# Patient Record
Sex: FEMALE | Race: WHITE | NOT HISPANIC OR LATINO | Employment: FULL TIME | URBAN - METROPOLITAN AREA
[De-identification: names, ages, dates, MRNs, and addresses within clinical notes are randomized per-mention and may not be internally consistent; named-entity substitution may affect disease eponyms.]

---

## 2020-12-22 ENCOUNTER — IMMUNIZATION (OUTPATIENT)
Dept: VACCINE CLINIC | Facility: HOSPITAL | Age: 40
End: 2020-12-22

## 2020-12-22 PROCEDURE — 91300 HC SARSCOV02 VAC 30MCG/0.3ML IM: CPT | Performed by: INTERNAL MEDICINE

## 2020-12-22 PROCEDURE — 0001A: CPT | Performed by: INTERNAL MEDICINE

## 2021-01-12 ENCOUNTER — IMMUNIZATION (OUTPATIENT)
Dept: VACCINE CLINIC | Facility: HOSPITAL | Age: 41
End: 2021-01-12

## 2021-01-12 PROCEDURE — 0002A: CPT | Performed by: INTERNAL MEDICINE

## 2021-01-12 PROCEDURE — 0001A: CPT | Performed by: INTERNAL MEDICINE

## 2021-01-12 PROCEDURE — 91300 HC SARSCOV02 VAC 30MCG/0.3ML IM: CPT | Performed by: INTERNAL MEDICINE

## 2022-01-20 ENCOUNTER — IMMUNIZATION (OUTPATIENT)
Dept: VACCINE CLINIC | Facility: HOSPITAL | Age: 42
End: 2022-01-20

## 2022-01-20 PROCEDURE — 91300 HC SARSCOV02 VAC 30MCG/0.3ML IM: CPT | Performed by: INTERNAL MEDICINE

## 2022-01-20 PROCEDURE — 0004A HC ADM SARSCOV2 30MCG/0.3ML BOOSTER: CPT | Performed by: INTERNAL MEDICINE

## 2022-10-03 ENCOUNTER — IMMUNIZATION (OUTPATIENT)
Dept: VACCINE CLINIC | Facility: HOSPITAL | Age: 42
End: 2022-10-03

## 2022-10-03 DIAGNOSIS — Z23 NEED FOR VACCINATION: Primary | ICD-10-CM

## 2022-10-03 PROCEDURE — 91312 HC SARSCOV2 VAC 30MCG/0.3ML IM BIVALENT BOOSTER 12 YRS AND OLDER: CPT | Performed by: HOSPITALIST

## 2022-10-03 PROCEDURE — 0124A: CPT | Performed by: HOSPITALIST

## 2022-10-14 ENCOUNTER — OFFICE VISIT (OUTPATIENT)
Dept: FAMILY MEDICINE CLINIC | Facility: CLINIC | Age: 42
End: 2022-10-14

## 2022-10-14 ENCOUNTER — LAB (OUTPATIENT)
Dept: LAB | Facility: HOSPITAL | Age: 42
End: 2022-10-14

## 2022-10-14 VITALS
SYSTOLIC BLOOD PRESSURE: 112 MMHG | OXYGEN SATURATION: 96 % | DIASTOLIC BLOOD PRESSURE: 86 MMHG | TEMPERATURE: 98 F | BODY MASS INDEX: 39.09 KG/M2 | HEART RATE: 78 BPM | WEIGHT: 229 LBS | HEIGHT: 64 IN | RESPIRATION RATE: 16 BRPM

## 2022-10-14 DIAGNOSIS — Z13.1 SCREENING FOR DIABETES MELLITUS: ICD-10-CM

## 2022-10-14 DIAGNOSIS — Z23 ENCOUNTER FOR IMMUNIZATION: ICD-10-CM

## 2022-10-14 DIAGNOSIS — R10.9 ABDOMINAL PAIN IN FEMALE PATIENT: ICD-10-CM

## 2022-10-14 DIAGNOSIS — Z13.220 SCREENING, LIPID: ICD-10-CM

## 2022-10-14 DIAGNOSIS — Z23 IMMUNIZATION DUE: ICD-10-CM

## 2022-10-14 LAB
ANION GAP SERPL CALCULATED.3IONS-SCNC: 11 MMOL/L (ref 5–15)
BILIRUB BLD-MCNC: NEGATIVE MG/DL
BUN SERPL-MCNC: 16 MG/DL (ref 6–20)
BUN/CREAT SERPL: 19.5 (ref 7–25)
CALCIUM SPEC-SCNC: 8.9 MG/DL (ref 8.6–10.5)
CHLORIDE SERPL-SCNC: 101 MMOL/L (ref 98–107)
CHOLEST SERPL-MCNC: 185 MG/DL (ref 0–200)
CLARITY, POC: CLEAR
CO2 SERPL-SCNC: 25 MMOL/L (ref 22–29)
COLOR UR: YELLOW
CREAT SERPL-MCNC: 0.82 MG/DL (ref 0.57–1)
EGFRCR SERPLBLD CKD-EPI 2021: 92.3 ML/MIN/1.73
EXPIRATION DATE: NORMAL
GLUCOSE SERPL-MCNC: 88 MG/DL (ref 65–99)
GLUCOSE UR STRIP-MCNC: NEGATIVE MG/DL
HDLC SERPL-MCNC: 55 MG/DL (ref 40–60)
KETONES UR QL: NEGATIVE
LDLC SERPL CALC-MCNC: 116 MG/DL (ref 0–100)
LDLC/HDLC SERPL: 2.08 {RATIO}
LEUKOCYTE EST, POC: NEGATIVE
Lab: NORMAL
NITRITE UR-MCNC: NEGATIVE MG/ML
PH UR: 6.5 [PH] (ref 5–8)
POTASSIUM SERPL-SCNC: 4 MMOL/L (ref 3.5–5.2)
PROT UR STRIP-MCNC: NEGATIVE MG/DL
RBC # UR STRIP: NEGATIVE /UL
SODIUM SERPL-SCNC: 137 MMOL/L (ref 136–145)
SP GR UR: 1.02 (ref 1–1.03)
TRIGL SERPL-MCNC: 78 MG/DL (ref 0–150)
UROBILINOGEN UR QL: NORMAL
VLDLC SERPL-MCNC: 14 MG/DL (ref 5–40)

## 2022-10-14 PROCEDURE — 80048 BASIC METABOLIC PNL TOTAL CA: CPT | Performed by: STUDENT IN AN ORGANIZED HEALTH CARE EDUCATION/TRAINING PROGRAM

## 2022-10-14 PROCEDURE — 90686 IIV4 VACC NO PRSV 0.5 ML IM: CPT | Performed by: STUDENT IN AN ORGANIZED HEALTH CARE EDUCATION/TRAINING PROGRAM

## 2022-10-14 PROCEDURE — 90471 IMMUNIZATION ADMIN: CPT | Performed by: STUDENT IN AN ORGANIZED HEALTH CARE EDUCATION/TRAINING PROGRAM

## 2022-10-14 PROCEDURE — 81003 URINALYSIS AUTO W/O SCOPE: CPT | Performed by: STUDENT IN AN ORGANIZED HEALTH CARE EDUCATION/TRAINING PROGRAM

## 2022-10-14 PROCEDURE — 90715 TDAP VACCINE 7 YRS/> IM: CPT | Performed by: STUDENT IN AN ORGANIZED HEALTH CARE EDUCATION/TRAINING PROGRAM

## 2022-10-14 PROCEDURE — 90472 IMMUNIZATION ADMIN EACH ADD: CPT | Performed by: STUDENT IN AN ORGANIZED HEALTH CARE EDUCATION/TRAINING PROGRAM

## 2022-10-14 PROCEDURE — 99203 OFFICE O/P NEW LOW 30 MIN: CPT | Performed by: STUDENT IN AN ORGANIZED HEALTH CARE EDUCATION/TRAINING PROGRAM

## 2022-10-14 PROCEDURE — 80061 LIPID PANEL: CPT | Performed by: STUDENT IN AN ORGANIZED HEALTH CARE EDUCATION/TRAINING PROGRAM

## 2022-10-14 RX ORDER — MULTIPLE VITAMINS W/ MINERALS TAB 9MG-400MCG
1 TAB ORAL DAILY
COMMUNITY

## 2022-10-14 NOTE — PROGRESS NOTES
"  Established Patient Office Visit        Subjective      Chief Complaint:  Cystitis (Patient having lower abdominal cramps, a lot of debris in urine, concerned having bladder spasms, this has been going on for about the last three months )      History of Present Illness: Nelli Benites is a 41 y.o. female who presents for change in urine color and sediment in urine. Sediment in urine is increasing. she has random suprapubic cramping. No association period. It is in the suprapubic region focally. No blood in urine. No dysuria. Has regular periods. No spotting. This has been going on for 6 months but worse over 3 months.      No fevers chills or weight loss. Last period was 9/20. Declines pregnancy test.       There is no problem list on file for this patient.        Current Outpatient Medications:   •  multivitamin with minerals tablet tablet, Take 1 tablet by mouth Daily., Disp: , Rfl:        Objective     Physical Exam:   Vital Signs:   /86 (BP Location: Left arm, Patient Position: Sitting, Cuff Size: Adult)   Pulse 78   Temp 98 °F (36.7 °C) (Temporal)   Resp 16   Ht 162.6 cm (64\")   Wt 104 kg (229 lb)   LMP 09/20/2022 (Exact Date)   SpO2 96%   BMI 39.31 kg/m²      Physical Exam  Constitutional:       General: She is not in acute distress.     Appearance: She is not ill-appearing.   Cardiovascular:      Rate and Rhythm: Normal rate and regular rhythm.   Pulmonary:      Effort: Pulmonary effort is normal.      Breath sounds: Normal breath sounds.   Neurological:      Mental Status: She is alert.   Psychiatric:         Thought Content: Thought content normal.   Mild suprapubic tenderness.  No abdominal mass.         Assessment / Plan      Assessment/Plan:   Diagnoses and all orders for this visit:    1. Abdominal pain in female patient  -     POCT urinalysis dipstick, automated  -UA normal and no alarm signs.  Increase water intake as main treatment avoid spicy foods and artificial sweeteners.  " Differential could include interstitial cystitis or even gasseos pain. No evidence of UTI today.  Follow-up if worsening follow with OB/GYN.    2. Screening for diabetes mellitus  -     Basic Metabolic Panel    3. Encounter for immunization  -     Tdap Vaccine Greater Than or Equal To 8yo IM    4. Immunization due  -     FluLaval/Fluzone >6 mos (3794-2522)    5. Screening, lipid  -     Lipid Panel      Follow Up:   Return in about 1 year (around 10/14/2023) for Wellness visit.      MDM: I spent 30 minutes caring for Nelli on this date of service. This time includes time spent by me in the following activities: preparing for the visit, performing a medically appropriate examination and/or evaluation, counseling and educating the patient/family/caregiver and documenting information in the medical record.      Jin Del Rosario MD  Family Medicine - [facility]

## 2023-10-16 ENCOUNTER — LAB (OUTPATIENT)
Dept: LAB | Facility: HOSPITAL | Age: 43
End: 2023-10-16
Payer: COMMERCIAL

## 2023-10-16 ENCOUNTER — OFFICE VISIT (OUTPATIENT)
Dept: FAMILY MEDICINE CLINIC | Facility: CLINIC | Age: 43
End: 2023-10-16
Payer: COMMERCIAL

## 2023-10-16 VITALS
BODY MASS INDEX: 37.22 KG/M2 | HEART RATE: 82 BPM | RESPIRATION RATE: 18 BRPM | WEIGHT: 218 LBS | OXYGEN SATURATION: 98 % | TEMPERATURE: 97.8 F | SYSTOLIC BLOOD PRESSURE: 112 MMHG | DIASTOLIC BLOOD PRESSURE: 72 MMHG | HEIGHT: 64 IN

## 2023-10-16 DIAGNOSIS — Z00.00 ENCOUNTER FOR ROUTINE ADULT HEALTH EXAMINATION WITHOUT ABNORMAL FINDINGS: ICD-10-CM

## 2023-10-16 DIAGNOSIS — E66.09 CLASS 2 OBESITY DUE TO EXCESS CALORIES WITHOUT SERIOUS COMORBIDITY WITH BODY MASS INDEX (BMI) OF 37.0 TO 37.9 IN ADULT: ICD-10-CM

## 2023-10-16 DIAGNOSIS — Z23 IMMUNIZATION DUE: ICD-10-CM

## 2023-10-16 PROBLEM — E66.812 CLASS 2 OBESITY DUE TO EXCESS CALORIES WITHOUT SERIOUS COMORBIDITY WITH BODY MASS INDEX (BMI) OF 37.0 TO 37.9 IN ADULT: Status: ACTIVE | Noted: 2023-10-16

## 2023-10-16 LAB
ALBUMIN SERPL-MCNC: 4.6 G/DL (ref 3.5–5.2)
ALBUMIN/GLOB SERPL: 1.5 G/DL
ALP SERPL-CCNC: 58 U/L (ref 39–117)
ALT SERPL W P-5'-P-CCNC: 26 U/L (ref 1–33)
ANION GAP SERPL CALCULATED.3IONS-SCNC: 12 MMOL/L (ref 5–15)
AST SERPL-CCNC: 24 U/L (ref 1–32)
BILIRUB SERPL-MCNC: 0.9 MG/DL (ref 0–1.2)
BUN SERPL-MCNC: 15 MG/DL (ref 6–20)
BUN/CREAT SERPL: 17.9 (ref 7–25)
CALCIUM SPEC-SCNC: 10 MG/DL (ref 8.6–10.5)
CHLORIDE SERPL-SCNC: 100 MMOL/L (ref 98–107)
CHOLEST SERPL-MCNC: 185 MG/DL (ref 0–200)
CO2 SERPL-SCNC: 26 MMOL/L (ref 22–29)
CREAT SERPL-MCNC: 0.84 MG/DL (ref 0.57–1)
DEPRECATED RDW RBC AUTO: 39.3 FL (ref 37–54)
EGFRCR SERPLBLD CKD-EPI 2021: 89.1 ML/MIN/1.73
ERYTHROCYTE [DISTWIDTH] IN BLOOD BY AUTOMATED COUNT: 12 % (ref 12.3–15.4)
GLOBULIN UR ELPH-MCNC: 3 GM/DL
GLUCOSE SERPL-MCNC: 99 MG/DL (ref 65–99)
HCT VFR BLD AUTO: 42.4 % (ref 34–46.6)
HDLC SERPL-MCNC: 60 MG/DL (ref 40–60)
HGB BLD-MCNC: 14.4 G/DL (ref 12–15.9)
LDLC SERPL CALC-MCNC: 109 MG/DL (ref 0–100)
LDLC/HDLC SERPL: 1.79 {RATIO}
MCH RBC QN AUTO: 30.8 PG (ref 26.6–33)
MCHC RBC AUTO-ENTMCNC: 34 G/DL (ref 31.5–35.7)
MCV RBC AUTO: 90.6 FL (ref 79–97)
PLATELET # BLD AUTO: 286 10*3/MM3 (ref 140–450)
PMV BLD AUTO: 11.3 FL (ref 6–12)
POTASSIUM SERPL-SCNC: 4.1 MMOL/L (ref 3.5–5.2)
PROT SERPL-MCNC: 7.6 G/DL (ref 6–8.5)
RBC # BLD AUTO: 4.68 10*6/MM3 (ref 3.77–5.28)
SODIUM SERPL-SCNC: 138 MMOL/L (ref 136–145)
TRIGL SERPL-MCNC: 89 MG/DL (ref 0–150)
VLDLC SERPL-MCNC: 16 MG/DL (ref 5–40)
WBC NRBC COR # BLD: 6.54 10*3/MM3 (ref 3.4–10.8)

## 2023-10-16 PROCEDURE — 80061 LIPID PANEL: CPT | Performed by: STUDENT IN AN ORGANIZED HEALTH CARE EDUCATION/TRAINING PROGRAM

## 2023-10-16 PROCEDURE — 85027 COMPLETE CBC AUTOMATED: CPT

## 2023-10-16 PROCEDURE — 80053 COMPREHEN METABOLIC PANEL: CPT

## 2023-10-16 NOTE — ASSESSMENT & PLAN NOTE
Patient's (Body mass index is 37.42 kg/m².) indicates that they are obese (BMI >30) with health conditions that include none . Weight is unchanged. BMI  is above average; BMI management plan is completed. We discussed portion control and increasing exercise.

## 2023-10-16 NOTE — PROGRESS NOTES
"     Female Physical Note      Patient Name: Nelli Benites  : 1980   MRN: [id number]     Subjective     Subjective      Chief Complaint:    Chief Complaint   Patient presents with    Annual Exam       History of Present Illness: Nelli Benites is a 42 y.o. female who is here today for their annual health maintenance and physical.         Past Medical History, Social History, Family History and Care Team were all reviewed with patient and updated as appropriate.     Medications:     Current Outpatient Medications:     multivitamin with minerals tablet tablet, Take 1 tablet by mouth Daily., Disp: , Rfl:     Allergies:   No Known Allergies    Immunizations:  Td/Tdap(Booster Q 10 yrs):  utd   Flu (Yearly):  ordered     Pap:    Last Completed Pap Smear       This patient has no relevant Health Maintenance data.         F/u with gyn     Mammogram:    Last Completed Mammogram       This patient has no relevant Health Maintenance data.          Call [facility] for mammo     Diet/Physical activity: discussed healthy diet       Depression: PHQ-2 Depression Screening  PHQ-2 Depression Screening  Little interest or pleasure in doing things? 0-->not at all   Feeling down, depressed, or hopeless? 0-->not at all   PHQ-2 Total Score 0         Objective   Objective     Physical Exam:  Vital Signs:   Vitals:    10/16/23 0727 10/16/23 0757   BP: 110/90 112/72   Pulse: 82    Resp: 18    Temp: 97.8 °F (36.6 °C)    SpO2: 98%    Weight: 98.9 kg (218 lb)    Height: 162.6 cm (64\")    PainSc: 0-No pain      Body mass index is 37.42 kg/m².     Physical Exam  Constitutional:       General: She is not in acute distress.     Appearance: She is not ill-appearing.   Cardiovascular:      Rate and Rhythm: Normal rate and regular rhythm.   Pulmonary:      Effort: Pulmonary effort is normal.      Breath sounds: Normal breath sounds.   Neurological:      Mental Status: She is alert.   Psychiatric:         Thought Content: Thought content normal. " Colonoscopy: What to Expect at [address] [place] [address]  After you have a colonoscopy, you will stay at the clinic for 1 to 2 hours until the medicines wear off. Then you can go home. But you will need to arrange for a ride. Your doctor will tell you when you can eat and do your other usual activities. Your doctor will talk to you about when you will need your next colonoscopy. Your doctor can help you decide how often you need to be checked. This will depend on the results of your test and your risk for colorectal cancer. After the test, you may be bloated or have gas pains. You may need to pass gas. If a biopsy was done or a polyp was removed, you may have streaks of blood in your stool (feces) for a few days. Problems such as heavy rectal bleeding may not occur until several weeks after the test. This isn't common. But it can happen after polyps are removed. This care sheet gives you a general idea about how long it will take for you to recover. But each person recovers at a different pace. Follow the steps below to get better as quickly as possible. How can you care for yourself at home? Activity    · Rest when you feel tired.     · You can do your normal activities when it feels okay to do so. Diet    · Follow your doctor's directions for eating.     · Unless your doctor has told you not to, drink plenty of fluids. This helps to replace the fluids that were lost during the colon prep.     · Do not drink alcohol. Medicines    · Your doctor will tell you if and when you can restart your medicines. He or she will also give you instructions about taking any new medicines.     · If you take blood thinners, such as warfarin (Coumadin), clopidogrel (Plavix), or aspirin, be sure to talk to your doctor. He or she will tell you if and when to start taking those medicines again.  Make sure that you understand exactly what your doctor wants you to do.     · If polyps were removed or a biopsy was done during the test,         Procedures    Assessment / Plan      Assessment/Plan:   Diagnoses and all orders for this visit:    1. Encounter for routine adult health examination without abnormal findings  -     Comprehensive Metabolic Panel; Future  -     Lipid Panel  -     CBC (No Diff); Future    2. Immunization due  -     Fluzone >6 Months (7890-1960)    3. Class 2 obesity due to excess calories without serious comorbidity with body mass index (BMI) of 37.0 to 37.9 in adult  Assessment & Plan:  Patient's (Body mass index is 37.42 kg/m².) indicates that they are obese (BMI >30) with health conditions that include none . Weight is unchanged. BMI  is above average; BMI management plan is completed. We discussed portion control and increasing exercise.           Follow Up:   Return in about 1 year (around 10/16/2024) for Wellness visit.    Healthcare Maintenance:   Health maintenance counseling included discussion of healthy diet and physical activity  and Vaccinations.  Nelli Antonionscara voices understanding and acceptance of this advice and will call back with any further questions or concerns. AVS with preventive healthcare tips printed for patient.     Jin Del Rosario MD   [facility]    your doctor may tell you not to take aspirin or other anti-inflammatory medicines for a few days. These include ibuprofen (Advil, Motrin) and naproxen (Aleve). Other instructions    · For your safety, do not drive or operate machinery until the medicine wears off and you can think clearly. Your doctor may tell you not to drive or operate machinery until the day after your test.     · Do not sign legal documents or make major decisions until the medicine wears off and you can think clearly. The anesthesia can make it hard for you to fully understand what you are agreeing to. Follow-up care is a key part of your treatment and safety. Be sure to make and go to all appointments, and call your doctor if you are having problems. It's also a good idea to know your test results and keep a list of the medicines you take. When should you call for help? Call 911 anytime you think you may need emergency care. For example, call if:    · You passed out (lost consciousness).     · You pass maroon or bloody stools.     · You have trouble breathing.    Call your doctor now or seek immediate medical care if:    · You have pain that does not get better after you take pain medicine.     · You are sick to your stomach or cannot drink fluids.     · You have new or worse belly pain.     · You have blood in your stools.     · You have a fever.     · You cannot pass stools or gas.    Watch closely for changes in your health, and be sure to contact your doctor if you have any problems. Where can you learn more? Go to http://bebeto-nancy.info/. Enter E264 in the search box to learn more about \"Colonoscopy: What to Expect at Home. \"  Current as of: March 27, 2018  Content Version: 11.9  © 0778-6573 [employer]. Care instructions adapted under license by [employer] (which disclaims liability or warranty for this information).  If you have questions about a medical condition or this instruction, always ask your healthcare professional. [place] [address] any warranty or liability for your use of this information. Colon Polyps: Care Instructions  Your Care Instructions    Colon polyps are growths in the colon or the rectum. The cause of most colon polyps is not known, and most people who get them do not have any problems. But a certain kind can turn into cancer. For this reason, regular testing for colon polyps is important for people age 48 and older and anyone who has an increased risk for colon cancer. Polyps are usually found through routine colon cancer screening tests. Although most colon polyps are not cancerous, they are usually removed and then tested for cancer. Screening for colon cancer saves lives because the cancer can usually be cured if it is caught early. If you have a polyp that is the type that can turn into cancer, you may need more tests to examine your entire colon. The doctor will remove any other polyps that he or she finds, and you will be tested more often. Follow-up care is a key part of your treatment and safety. Be sure to make and go to all appointments, and call your doctor if you are having problems. It's also a good idea to know your test results and keep a list of the medicines you take. How can you care for yourself at home? Regular exams to look for colon polyps are the best way to prevent polyps from turning into colon cancer. These can include stool tests, sigmoidoscopy, colonoscopy, and CT colonography. Talk with your doctor about a testing schedule that is right for you. To prevent polyps  There is no home treatment that can prevent colon polyps. But these steps may help lower your risk for cancer. · Stay active. Being active can help you get to and stay at a healthy weight. Try to exercise on most days of the week. Walking is a good choice. · Eat well.  Choose a variety of vegetables, fruits, legumes (such as peas and beans), fish, poultry, and whole grains. · Do not smoke. If you need help quitting, talk to your doctor about stop-smoking programs and medicines. These can increase your chances of quitting for good. · If you drink alcohol, limit how much you drink. Limit alcohol to 2 drinks a day for men and 1 drink a day for women. When should you call for help? Call your doctor now or seek immediate medical care if:    · You have severe belly pain.     · Your stools are maroon or very bloody.    Watch closely for changes in your health, and be sure to contact your doctor if:    · You have a fever.     · You have nausea or vomiting.     · You have a change in bowel habits (new constipation or diarrhea).     · Your symptoms get worse or are not improving as expected. Where can you learn more? Go to http://bebeto-nancy.info/. Enter 95 [id number] in the search box to learn more about \"Colon Polyps: Care Instructions. \"  Current as of: March 27, 2018  Content Version: 11.9  © 7682-4447 [employer]. Care instructions adapted under license by [employer] (which disclaims liability or warranty for this information). If you have questions about a medical condition or this instruction, always ask your healthcare professional. [place] [address] any warranty or liability for your use of this information. Hemorrhoids: Care Instructions  Your Care Instructions    Hemorrhoids are enlarged veins that develop in the anal canal. Bleeding during bowel movements, itching, swelling, and rectal pain are the most common symptoms. They can be uncomfortable at times, but hemorrhoids rarely are a serious problem. You can treat most hemorrhoids with simple changes to your diet and bowel habits. These changes include eating more fiber and not straining to pass stools. Most hemorrhoids do not need surgery or other treatment unless they are very large and painful or bleed a lot.   Follow-up care is a key part of your treatment and safety. Be sure to make and go to all appointments, and call your doctor if you are having problems. It's also a good idea to know your test results and keep a list of the medicines you take. How can you care for yourself at home? · Sit in a few inches of warm water (sitz bath) 3 times a day and after bowel movements. The warm water helps with pain and itching. · Put ice on your anal area several times a day for 10 minutes at a time. Put a thin cloth between the ice and your skin. Follow this by placing a warm, wet towel on the area for another 10 to 20 minutes. · Take pain medicines exactly as directed. ? If the doctor gave you a prescription medicine for pain, take it as prescribed. ? If you are not taking a prescription pain medicine, ask your doctor if you can take an over-the-counter medicine. · Keep the anal area clean, but be gentle. Use water and a fragrance-free soap, such as [place], or use baby wipes or medicated pads, such as Tucks. · Wear cotton underwear and loose clothing to decrease moisture in the anal area. · Eat more fiber. Include foods such as whole-grain breads and cereals, raw vegetables, raw and dried fruits, and beans. · Drink plenty of fluids, enough so that your urine is light yellow or clear like water. If you have kidney, heart, or liver disease and have to limit fluids, talk with your doctor before you increase the amount of fluids you drink. · Use a stool softener that contains bran or psyllium. You can save money by buying bran or psyllium (available in bulk at most health food stores) and sprinkling it on foods or stirring it into fruit juice. Or you can use a product such as Metamucil or Hydrocil. · Practice healthy bowel habits. ? Go to the bathroom as soon as you have the urge. ? Avoid straining to pass stools. Relax and give yourself time to let things happen naturally. ? Do not hold your breath while passing stools. ? Do not read while sitting on the toilet. Get off the toilet as soon as you have finished. · Take your medicines exactly as prescribed. Call your doctor if you think you are having a problem with your medicine. When should you call for help? Call 911 anytime you think you may need emergency care. For example, call if:    · You pass maroon or very bloody stools.    Call your doctor now or seek immediate medical care if:    · You have increased pain.     · You have increased bleeding.    Watch closely for changes in your health, and be sure to contact your doctor if:    · Your symptoms have not improved after 3 or 4 days. Where can you learn more? Go to http://bebeto-nancy.info/. Enter F228 in the search box to learn more about \"Hemorrhoids: Care Instructions. \"  Current as of: March 27, 2018  Content Version: 11.9  © 2617-1056 [employer]. Care instructions adapted under license by [employer] (which disclaims liability or warranty for this information). If you have questions about a medical condition or this instruction, always ask your healthcare professional. [place] [address] any warranty or liability for your use of this information. Diverticulosis: Care Instructions  Your Care Instructions  In diverticulosis, pouches called diverticula form in the wall of the large intestine (colon). The pouches do not cause any pain or other symptoms. Most people who have diverticulosis do not know they have it. But the pouches sometimes bleed, and if they become infected, they can cause pain and other symptoms. When this happens, it is called diverticulitis. Diverticula form when pressure pushes the wall of the colon outward at certain weak points. A diet that is too low in fiber can cause diverticula. Follow-up care is a key part of your treatment and safety. Be sure to make and go to all appointments, and call your doctor if you are having problems.  It's also a good idea to know your test results and keep a list of the medicines you take. How can you care for yourself at home? · Include fruits, leafy green vegetables, beans, and whole grains in your diet each day. These foods are high in fiber. · Take a fiber supplement, such as Citrucel or Metamucil, every day if needed. Read and follow all instructions on the label. · Drink plenty of fluids, enough so that your urine is light yellow or clear like water. If you have kidney, heart, or liver disease and have to limit fluids, talk with your doctor before you increase the amount of fluids you drink. · Get at least 30 minutes of exercise on most days of the week. Walking is a good choice. You also may want to do other activities, such as running, swimming, cycling, or playing tennis or team sports. · Cut out foods that cause gas, pain, or other symptoms. When should you call for help? Call your doctor now or seek immediate medical care if:    · You have belly pain.     · You pass maroon or very bloody stools.     · You have a fever.     · You have nausea and vomiting.     · You have unusual changes in your bowel movements or abdominal swelling.     · You have burning pain when you urinate.     · You have abnormal vaginal discharge.     · You have shoulder pain.     · You have cramping pain that does not get better when you have a bowel movement or pass gas.     · You pass gas or stool from your urethra while urinating.    Watch closely for changes in your health, and be sure to contact your doctor if you have any problems. Where can you learn more? Go to http://bebeto-nancy.info/. Enter T466 in the search box to learn more about \"Diverticulosis: Care Instructions. \"  Current as of: March 27, 2018  Content Version: 11.9  © 0340-0417 [employer]. Care instructions adapted under license by [employer] (which disclaims liability or warranty for this information).  If you have questions about a medical condition or this instruction, always ask your healthcare professional. [place] [address] any warranty or liability for your use of this information. DISCHARGE SUMMARY from Nurse    PATIENT INSTRUCTIONS:    After general anesthesia or intravenous sedation, for 24 hours or while taking prescription Narcotics:  · Limit your activities  · Do not drive and operate hazardous machinery  · Do not make important personal or business decisions  · Do  not drink alcoholic beverages  · If you have not urinated within 8 hours after discharge, please contact your surgeon on call. Report the following to your surgeon:  · Excessive pain, swelling, redness or odor of or around the surgical area  · Temperature over 100.5  · Nausea and vomiting lasting longer than 4 hours or if unable to take medications  · Any signs of decreased circulation or nerve impairment to extremity: change in color, persistent  numbness, tingling, coldness or increase pain  · Any questions    *  Please give a list of your current medications to your Primary Care Provider. *  Please update this list whenever your medications are discontinued, doses are      changed, or new medications (including over-the-counter products) are added. *  Please carry medication information at all times in case of emergency situations. These are general instructions for a healthy lifestyle:    No smoking/ No tobacco products/ Avoid exposure to second hand smoke  Surgeon General's Warning:  Quitting smoking now greatly reduces serious risk to your health.     Obesity, smoking, and sedentary lifestyle greatly increases your risk for illness    A healthy diet, regular physical exercise & weight monitoring are important for maintaining a healthy lifestyle    You may be retaining fluid if you have a history of heart failure or if you experience any of the following symptoms:  Weight gain of 3 pounds or more overnight or 5 pounds in a week, increased swelling in our hands or feet or shortness of breath while lying flat in bed. Please call your doctor as soon as you notice any of these symptoms; do not wait until your next office visit. Recognize signs and symptoms of STROKE:    F-face looks uneven    A-arms unable to move or move unevenly    S-speech slurred or non-existent    T-time-call 911 as soon as signs and symptoms begin-DO NOT go       Back to bed or wait to see if you get better-TIME IS BRAIN. Warning Signs of HEART ATTACK     Call 911 if you have these symptoms:   Chest discomfort. Most heart attacks involve discomfort in the center of the chest that lasts more than a few minutes, or that goes away and comes back. It can feel like uncomfortable pressure, squeezing, fullness, or pain.  Discomfort in other areas of the upper body. Symptoms can include pain or discomfort in one or both arms, the back, neck, jaw, or stomach.  Shortness of breath with or without chest discomfort.  Other signs may include breaking out in a cold sweat, nausea, or lightheadedness. Don't wait more than five minutes to call 911 - MINUTES MATTER! Fast action can save your life. Calling 911 is almost always the fastest way to get lifesaving treatment. Emergency Medical Services staff can begin treatment when they arrive -- up to an hour sooner than if someone gets to the hospital by car. The discharge information has been reviewed with the patient/family. The patient/family verbalized understanding. Discharge medications reviewed with the patient/family and appropriate educational materials and side effects teaching were provided.   ___________________________________________________________________________________________________________________________________

## 2024-10-18 ENCOUNTER — LAB (OUTPATIENT)
Dept: LAB | Facility: HOSPITAL | Age: 44
End: 2024-10-18
Payer: COMMERCIAL

## 2024-10-18 ENCOUNTER — OFFICE VISIT (OUTPATIENT)
Dept: FAMILY MEDICINE CLINIC | Facility: CLINIC | Age: 44
End: 2024-10-18
Payer: COMMERCIAL

## 2024-10-18 VITALS
OXYGEN SATURATION: 99 % | HEIGHT: 64 IN | DIASTOLIC BLOOD PRESSURE: 78 MMHG | SYSTOLIC BLOOD PRESSURE: 108 MMHG | HEART RATE: 81 BPM | TEMPERATURE: 98 F | WEIGHT: 225.8 LBS | RESPIRATION RATE: 17 BRPM | BODY MASS INDEX: 38.55 KG/M2

## 2024-10-18 DIAGNOSIS — Z00.00 ENCOUNTER FOR ROUTINE ADULT HEALTH EXAMINATION WITHOUT ABNORMAL FINDINGS: ICD-10-CM

## 2024-10-18 DIAGNOSIS — Z23 NEED FOR IMMUNIZATION AGAINST INFLUENZA: ICD-10-CM

## 2024-10-18 DIAGNOSIS — G54.0 THORACIC OUTLET SYNDROME: ICD-10-CM

## 2024-10-18 DIAGNOSIS — R42 VERTIGO: ICD-10-CM

## 2024-10-18 LAB
ALBUMIN SERPL-MCNC: 4.2 G/DL (ref 3.5–5.2)
ALBUMIN/GLOB SERPL: 1.5 G/DL
ALP SERPL-CCNC: 60 U/L (ref 39–117)
ALT SERPL W P-5'-P-CCNC: 20 U/L (ref 1–33)
ANION GAP SERPL CALCULATED.3IONS-SCNC: 9.5 MMOL/L (ref 5–15)
AST SERPL-CCNC: 19 U/L (ref 1–32)
BILIRUB SERPL-MCNC: 1 MG/DL (ref 0–1.2)
BUN SERPL-MCNC: 13 MG/DL (ref 6–20)
BUN/CREAT SERPL: 16.7 (ref 7–25)
CALCIUM SPEC-SCNC: 9 MG/DL (ref 8.6–10.5)
CHLORIDE SERPL-SCNC: 104 MMOL/L (ref 98–107)
CHOLEST SERPL-MCNC: 178 MG/DL (ref 0–200)
CO2 SERPL-SCNC: 22.5 MMOL/L (ref 22–29)
CREAT SERPL-MCNC: 0.78 MG/DL (ref 0.57–1)
DEPRECATED RDW RBC AUTO: 39.2 FL (ref 37–54)
EGFRCR SERPLBLD CKD-EPI 2021: 96.8 ML/MIN/1.73
ERYTHROCYTE [DISTWIDTH] IN BLOOD BY AUTOMATED COUNT: 11.8 % (ref 12.3–15.4)
GLOBULIN UR ELPH-MCNC: 2.8 GM/DL
GLUCOSE SERPL-MCNC: 83 MG/DL (ref 65–99)
HCT VFR BLD AUTO: 39.8 % (ref 34–46.6)
HDLC SERPL-MCNC: 57 MG/DL (ref 40–60)
HGB BLD-MCNC: 14.1 G/DL (ref 12–15.9)
LDLC SERPL CALC-MCNC: 108 MG/DL (ref 0–100)
LDLC/HDLC SERPL: 1.88 {RATIO}
MCH RBC QN AUTO: 32.3 PG (ref 26.6–33)
MCHC RBC AUTO-ENTMCNC: 35.4 G/DL (ref 31.5–35.7)
MCV RBC AUTO: 91.3 FL (ref 79–97)
PLATELET # BLD AUTO: 260 10*3/MM3 (ref 140–450)
PMV BLD AUTO: 11.3 FL (ref 6–12)
POTASSIUM SERPL-SCNC: 4.3 MMOL/L (ref 3.5–5.2)
PROT SERPL-MCNC: 7 G/DL (ref 6–8.5)
RBC # BLD AUTO: 4.36 10*6/MM3 (ref 3.77–5.28)
SODIUM SERPL-SCNC: 136 MMOL/L (ref 136–145)
TRIGL SERPL-MCNC: 68 MG/DL (ref 0–150)
TSH SERPL DL<=0.05 MIU/L-ACNC: 1.71 UIU/ML (ref 0.27–4.2)
VLDLC SERPL-MCNC: 13 MG/DL (ref 5–40)
WBC NRBC COR # BLD AUTO: 6.58 10*3/MM3 (ref 3.4–10.8)

## 2024-10-18 PROCEDURE — 99396 PREV VISIT EST AGE 40-64: CPT | Performed by: STUDENT IN AN ORGANIZED HEALTH CARE EDUCATION/TRAINING PROGRAM

## 2024-10-18 PROCEDURE — 90471 IMMUNIZATION ADMIN: CPT | Performed by: STUDENT IN AN ORGANIZED HEALTH CARE EDUCATION/TRAINING PROGRAM

## 2024-10-18 PROCEDURE — 80061 LIPID PANEL: CPT | Performed by: STUDENT IN AN ORGANIZED HEALTH CARE EDUCATION/TRAINING PROGRAM

## 2024-10-18 PROCEDURE — 90656 IIV3 VACC NO PRSV 0.5 ML IM: CPT | Performed by: STUDENT IN AN ORGANIZED HEALTH CARE EDUCATION/TRAINING PROGRAM

## 2024-10-18 PROCEDURE — 80050 GENERAL HEALTH PANEL: CPT | Performed by: STUDENT IN AN ORGANIZED HEALTH CARE EDUCATION/TRAINING PROGRAM

## 2024-10-18 PROCEDURE — 99214 OFFICE O/P EST MOD 30 MIN: CPT | Performed by: STUDENT IN AN ORGANIZED HEALTH CARE EDUCATION/TRAINING PROGRAM

## 2024-10-18 RX ORDER — FLUTICASONE PROPIONATE 50 UG/1
1 SPRAY, METERED NASAL DAILY
COMMUNITY

## 2024-10-18 RX ORDER — ONDANSETRON 4 MG/1
TABLET, ORALLY DISINTEGRATING ORAL
COMMUNITY

## 2024-10-18 RX ORDER — SCOLOPAMINE TRANSDERMAL SYSTEM 1 MG/1
PATCH, EXTENDED RELEASE TRANSDERMAL
COMMUNITY
End: 2024-10-18 | Stop reason: SDUPTHER

## 2024-10-18 RX ORDER — SCOLOPAMINE TRANSDERMAL SYSTEM 1 MG/1
1 PATCH, EXTENDED RELEASE TRANSDERMAL
Qty: 10 EACH | Refills: 2 | Status: SHIPPED | OUTPATIENT
Start: 2024-10-18

## 2024-10-18 RX ORDER — ESCITALOPRAM OXALATE 10 MG/1
10 TABLET ORAL DAILY
COMMUNITY
Start: 2024-09-27 | End: 2025-09-27

## 2024-10-18 NOTE — PROGRESS NOTES
Female Physical Note      Patient Name: Nelli Benites  : 1980   MRN: [id number]     Subjective     Subjective      Chief Complaint:    Chief Complaint   Patient presents with    Annual Exam       History of Present Illness: Nelli Benites is a 43 y.o. female who is here today for their annual health maintenance and physical.     When she wakes up she has numbness to the all the fingers. When she puts arm over head she gets ache and numbness. Hx of fractured collar bone.     Review of Systems:   Review of Systems    Past Medical History, Social History, Family History and Care Team were all reviewed with patient and updated as appropriate.     Medications:     Current Outpatient Medications:     escitalopram (LEXAPRO) 10 MG tablet, Take 1 tablet by mouth Daily., Disp: , Rfl:     fluticasone (FLONASE) 50 MCG/ACT nasal spray, Administer 1 spray into the nostril(s) as directed by provider Daily., Disp: , Rfl:     multivitamin with minerals tablet tablet, Take 1 tablet by mouth Daily., Disp: , Rfl:     ondansetron ODT (ZOFRAN-ODT) 4 MG disintegrating tablet, , Disp: , Rfl:     Scopolamine 1 MG/3DAYS patch, Place 1 patch on the skin as directed by provider Every 72 (Seventy-Two) Hours., Disp: 10 each, Rfl: 2    Allergies:   No Known Allergies    Immunizations:  Td/Tdap(Booster Q 10 yrs):  utd  Flu (Yearly):  done    Pap:    Last Completed Pap Smear            PAP SMEAR (Every 5 Years) Next due on 3/3/2028      2023  Patient-Reported (Performed Externally)                   Mammogram:    Last Completed Mammogram            MAMMOGRAM (Every 2 Years) Next due on 5/15/2026      05/15/2024  Mammo Screening Digital Tomosynthesis Bilateral With CAD    01/10/2022  MAMMO SCREENING DIGITAL TOMOSYNTHESIS BILATERAL W CAD    2021  MAMMO DIAGNOSTIC DIGITAL TOMOSYNTHESIS RIGHT W CAD    2021  MAMMO DIAGNOSTIC DIGITAL TOMOSYNTHESIS RIGHT W CAD    2021  MAMMO SCREENING DIGITAL TOMOSYNTHESIS  "BILATERAL W CAD    Only the first 5 history entries have been loaded, but more history exists.                       Diet/Physical activity: discussed    Sexual Health:     Depression: PHQ-2 Depression Screening  PHQ-2 Depression Screening  Little interest or pleasure in doing things? Not at all   Feeling down, depressed, or hopeless? Not at all   PHQ-2 Total Score 0         Objective   Objective     Physical Exam:  Vital Signs:   Vitals:    10/18/24 0736   BP: 108/78   BP Location: Left arm   Patient Position: Sitting   Cuff Size: Adult   Pulse: 81   Resp: 17   Temp: 98 °F (36.7 °C)   TempSrc: Infrared   SpO2: 99%   Weight: 102 kg (225 lb 12.8 oz)   Height: 162.6 cm (64\")     Body mass index is 38.76 kg/m².     Physical Exam    1+ radial pulses b/l    Procedures    Assessment / Plan      Assessment/Plan:   Diagnoses and all orders for this visit:    1. Encounter for routine adult health examination without abnormal findings  -     CBC (No Diff); Future  -     Comprehensive Metabolic Panel; Future  -     Lipid Panel; Future  -     TSH Rfx On Abnormal To Free T4; Future    2. Need for immunization against influenza  -     Fluzone >6mos ([id number])    3. Thoracic outlet syndrome  -     Doppler Arterial Multi Level Upper Extremity - Bilateral CAR; Future  -     EMG & Nerve Conduction Test; Future  -     XR Chest PA & Lateral; Future    4. Vertigo  -     Scopolamine 1 MG/3DAYS patch; Place 1 patch on the skin as directed by provider Every 72 (Seventy-Two) Hours.  Dispense: 10 each; Refill: 2       MDM: It appears well visit may have already been billed by gynecology this calendar year.  If this is the case please bill [id number] only for new problem uncertain prognosis requiring further evaluation for potential thoracic outlet syndrome    Follow Up:   Return in about 1 year (around 10/18/2025) for Wellness visit.    Healthcare Maintenance:   Health maintenance counseling included discussion of healthy diet and physical " activity.  Dental hygiene.  Vaccinations.  Nelli Benites voices understanding and acceptance of this advice and will call back with any further questions or concerns. AVS with preventive healthcare tips printed for patient.     Jin Del Rosario MD   [facility] [place]

## 2024-10-23 ENCOUNTER — HOSPITAL ENCOUNTER (OUTPATIENT)
Facility: HOSPITAL | Age: 44
Discharge: HOME | End: 2024-10-23
Payer: COMMERCIAL

## 2024-10-23 VITALS — BODY MASS INDEX: 38.39 KG/M2 | WEIGHT: 224.87 LBS | HEIGHT: 64 IN

## 2024-10-23 DIAGNOSIS — G54.0 THORACIC OUTLET SYNDROME: ICD-10-CM

## 2024-10-23 PROCEDURE — 71046 X-RAY EXAM CHEST 2 VIEWS: CPT

## 2024-10-23 PROCEDURE — 93923 UPR/LXTR ART STDY 3+ LVLS: CPT | Performed by: INTERNAL MEDICINE

## 2024-10-23 PROCEDURE — 93923 UPR/LXTR ART STDY 3+ LVLS: CPT

## 2024-10-24 DIAGNOSIS — R20.0 NUMBNESS OF ARM: Primary | ICD-10-CM

## 2024-10-24 LAB
BH CV UPPER ARTERIAL LEFT 1ST DIGIT SYS MAX: 114
BH CV UPPER ARTERIAL LEFT FBI 1ST DIGIT RATIO: 0.9
BH CV UPPER ARTERIAL LEFT WBI RATIO: 1.16
BH CV UPPER ARTERIAL RIGHT 1ST DIGIT SYS MAX: 121
BH CV UPPER ARTERIAL RIGHT FBI 1ST DIGIT RATIO: 0.96
BH CV UPPER ARTERIAL RIGHT WBI RATIO: 1.29
UPPER ARTERIAL LEFT ARM BRACHIAL SYS MAX: 120
UPPER ARTERIAL LEFT ARM RADIAL SYS MAX: 131
UPPER ARTERIAL LEFT ARM ULNAR SYS MAX: 146
UPPER ARTERIAL RIGHT ARM BRACHIAL SYS MAX: 126
UPPER ARTERIAL RIGHT ARM RADIAL SYS MAX: 158
UPPER ARTERIAL RIGHT ARM ULNAR SYS MAX: 162

## 2025-01-07 ENCOUNTER — HOSPITAL ENCOUNTER (OUTPATIENT)
Dept: NEUROLOGY | Facility: HOSPITAL | Age: 45
Discharge: HOME | End: 2025-01-07
Admitting: STUDENT IN AN ORGANIZED HEALTH CARE EDUCATION/TRAINING PROGRAM
Payer: COMMERCIAL

## 2025-01-07 DIAGNOSIS — G54.0 THORACIC OUTLET SYNDROME: ICD-10-CM

## 2025-01-07 PROCEDURE — 95886 MUSC TEST DONE W/N TEST COMP: CPT

## 2025-01-07 PROCEDURE — 95910 NRV CNDJ TEST 7-8 STUDIES: CPT

## 2025-01-07 RX ORDER — ESCITALOPRAM OXALATE 10 MG/1
10 TABLET ORAL DAILY
Qty: 30 TABLET | Refills: 11 | Status: SHIPPED | OUTPATIENT
Start: 2025-01-07 | End: 2026-01-07

## 2025-01-14 RX ORDER — ESCITALOPRAM OXALATE 10 MG/1
10 TABLET ORAL DAILY
Qty: 30 TABLET | Refills: 11 | Status: SHIPPED | OUTPATIENT
Start: 2025-01-14 | End: 2026-01-14

## 2025-05-06 ENCOUNTER — APPOINTMENT (OUTPATIENT)
Facility: HOSPITAL | Age: 45
End: 2025-05-06
Payer: COMMERCIAL

## 2025-05-06 ENCOUNTER — HOSPITAL ENCOUNTER (EMERGENCY)
Facility: HOSPITAL | Age: 45
Discharge: HOME | End: 2025-05-06
Attending: EMERGENCY MEDICINE | Admitting: EMERGENCY MEDICINE
Payer: COMMERCIAL

## 2025-05-06 VITALS
HEART RATE: 77 BPM | BODY MASS INDEX: 31.58 KG/M2 | OXYGEN SATURATION: 100 % | TEMPERATURE: 98.6 F | HEIGHT: 64 IN | SYSTOLIC BLOOD PRESSURE: 127 MMHG | WEIGHT: 185 LBS | RESPIRATION RATE: 16 BRPM | DIASTOLIC BLOOD PRESSURE: 82 MMHG

## 2025-05-06 DIAGNOSIS — S62.639B OPEN FRACTURE OF TUFT OF DISTAL PHALANX OF FINGER: Primary | ICD-10-CM

## 2025-05-06 PROCEDURE — 96365 THER/PROPH/DIAG IV INF INIT: CPT

## 2025-05-06 PROCEDURE — 25010000002 CEFAZOLIN PER 500 MG

## 2025-05-06 PROCEDURE — 73130 X-RAY EXAM OF HAND: CPT

## 2025-05-06 PROCEDURE — 99283 EMERGENCY DEPT VISIT LOW MDM: CPT | Performed by: EMERGENCY MEDICINE

## 2025-05-06 RX ORDER — CEPHALEXIN 500 MG/1
500 CAPSULE ORAL 4 TIMES DAILY
Qty: 28 CAPSULE | Refills: 0 | Status: SHIPPED | OUTPATIENT
Start: 2025-05-06 | End: 2025-05-13

## 2025-05-06 RX ADMIN — SODIUM CHLORIDE 2000 MG: 900 INJECTION INTRAVENOUS at 16:58

## 2025-05-06 NOTE — DISCHARGE INSTRUCTIONS
Please follow-up with hand surgeon and primary care as soon as possible.  Please return worsening or changing symptoms

## 2025-05-06 NOTE — FSED PROVIDER NOTE
"Subjective  History of Present Illness:    Patient is a 44-year-old female presents 1 day after injuring her right pinky finger.  Patient states she was laying stairs and was using machine and part of her finger got caught in the machine.  Patient states it was bleeding and she wrapped it up and then realized today that part of the finger was missing.  Patient states it feels tight but no significant pain.  Patient states this occurred yesterday evening.  Patient states she had a tetanus shot within the last 5 years.      Nurses Notes reviewed and agree, including vitals, allergies, social history and prior medical history.     REVIEW OF SYSTEMS: All systems reviewed and not pertinent unless noted.  Review of Systems    Past Medical History:   Diagnosis Date    BPPV (benign paroxysmal positional vertigo)     History of medical problems 23    LPPV       Allergies:    Patient has no known allergies.      No past surgical history on file.      Social History     Socioeconomic History    Marital status:    Tobacco Use    Smoking status: Former     Current packs/day: 0.00     Average packs/day: 1 pack/day for 9.0 years (9.0 ttl pk-yrs)     Types: Cigarettes     Start date: 1996     Quit date: 2005     Years since quittin.5     Passive exposure: Past    Smokeless tobacco: Never   Vaping Use    Vaping status: Never Used   Substance and Sexual Activity    Alcohol use: Yes     Alcohol/week: 1.0 standard drink of alcohol     Types: 1 Drinks containing 0.5 oz of alcohol per week     Comment: a drink a week    Drug use: Never    Sexual activity: Yes     Partners: Male     Birth control/protection: Vasectomy         Family History   Problem Relation Age of Onset    Osteoporosis Mother     Obesity Father     Obesity Paternal Grandmother        Objective  Physical Exam:  /82   Pulse 77   Temp 98.6 °F (37 °C)   Resp 16   Ht 162.6 cm (64\")   Wt 83.9 kg (185 lb)   LMP 2025   SpO2 100%   " BMI 31.76 kg/m²      Physical Exam  Constitutional:       Appearance: Well-developed. No acute distress  HENT:      Head: Normocephalic and atraumatic.      Mouth/Throat:      Mouth: Mucous membranes are moist.      Eyes:      Extraocular Movements: Extraocular movements intact.   Cardiovascular:      Rate and Rhythm: Normal rate and regular rhythm.      Heart sounds: Normal heart sounds.   Pulmonary:      Effort: Pulmonary effort is normal. No respiratory distress.    Musculoskeletal:         General: Right distal pinky finger avulsion, nail no longer attached and distal portion no longer attached.  Minimal to no bleeding noted.  No visible bone noted.  Isolated DIP and PIP both intact.  2+ pulses throughout      Extremities: Moves all 4s   Skin:     General: Skin is warm and dry.  Avulsion right distal fifth finger     Capillary Refill: Capillary refill takes less than 2 seconds.   Neurological:      Mental Status:Alert and oriented to person, place, and time.   Mentation is normal   Psychiatric:         Mood and Affect: Mood normal.         Behavior: Behavior normal.     Procedures    ED Course:         Lab Results (last 24 hours)       ** No results found for the last 24 hours. **             XR Hand 3+ View Right  Result Date: 5/6/2025  XR HAND 3+ VW RIGHT Date of Exam: 5/6/2025 3:56 PM EDT Indication: trauma Comparison: None available. Findings: There is comminuted fracture distal tuft of the distal phalanx fifth digit with loss of soft tissues consistent partially rotation. Soft tissue swelling. No other fractures. No radiopaque foreign bodies.     Impression: Impression: Partial rotation of the distal aspect distal phalanx fifth digit. Electronically Signed: Tonia Hills MD  5/6/2025 4:27 PM EDT  Workstation ID: [id number]         [facility]      Initial impression of presenting illness: Avulsion of right distal fifth finger after using machine last night.  Patient states this occurred yesterday evening.  Patient  states she looked around but did not find to the distal portion of her finger anywhere.    DDX: includes but is not limited to: Avulsion fracture, amputation, contusion, comminuted fracture, laceration, nail avulsion    Patient arrives comfortable, vital signs stable with vitals interpreted by myself.     Pertinent results: X-ray shows comminuted fracture distal tuft of the distal phalanx fifth digit with loss of soft tissues consistent partially rotation     Diagnostic information from other sources: chart Review    Interventions / Re-evaluation: IV Ancef    Medications   ceFAZolin 2000 mg IVPB in 100 mL NS (MBP) (0 mg Intravenous Stopped 5/6/25 7775)       Results/clinical rationale were discussed with patient    Consultations/Discussion of results with other physicians: attending who recommends IV antibiotics, home oral antibiotics and follow-up with hand surgeon.`Patient agreeable with this plan     Data interpreted: Nursing notes reviewed, vital signs reviewed.  Labs independently interpreted by me     Counseling: Discussed the results above with the patient regarding need for admission or discharge.  Patient understands and agrees plan of care.  Discussed with patients the results from today's visit. Discussed with patient strict return precautions and they verbalize understanding. Recommend to them following up with primary care as soon as possible. Patient is discharged hemodynamically stable and comfortable.   Discussed with patient need to follow-up with hand surgeon as soon as possible.  Patient received IV Ancef here and prescribed cephalexin.  Patient has nonstick dressing placed and finger splint in the emergency department and discussed to keep area clean and dry.    -----  ED Disposition       ED Disposition   Discharge    Condition   Stable    Comment   --             Final diagnoses:   Open fracture of tuft of distal phalanx of finger      Your Follow-Up Providers       Schedule an appointment as  soon as possible for a visit  with Jin Del Rosario MD.    Specialty: Family Medicine  [address] [facility]  [address]  [place] [address]  [phone]               Schedule an appointment as soon as possible for a visit  with Marycarmen Vásquez MD.    Specialties: Orthopedic Surgery, Hand Surgery  [address] [facility] [facility]  [address]  [place] [address]  [phone]                       Contact information for after-discharge care    Follow-up information has not been specified.                    Your medication list        START taking these medications        Instructions Last Dose Given Next Dose Due   cephalexin 500 MG capsule  Commonly known as: KEFLEX      Take 1 capsule by mouth 4 (Four) Times a Day for 7 days.              CONTINUE taking these medications        Instructions Last Dose Given Next Dose Due   escitalopram 10 MG tablet  Commonly known as: LEXAPRO      Take 1 tablet by mouth Daily.       fluticasone 50 MCG/ACT nasal spray  Commonly known as: FLONASE      Administer 1 spray into the nostril(s) as directed by provider Daily.       multivitamin with minerals tablet tablet      Take 1 tablet by mouth Daily.       ondansetron ODT 4 MG disintegrating tablet  Commonly known as: ZOFRAN-ODT           Scopolamine 1 MG/3DAYS patch      Place 1 patch on the skin as directed by provider Every 72 (Seventy-Two) Hours.                 Where to Get Your Medications        These medications were sent to [facility] - [place], [place] - [id number] - [phone]  - [phone] FX  [id number], [facility] [address]      Phone: [phone]   cephalexin 500 MG capsule

## 2025-05-08 ENCOUNTER — OFFICE VISIT (OUTPATIENT)
Dept: ORTHOPEDIC SURGERY | Facility: CLINIC | Age: 45
End: 2025-05-08
Payer: COMMERCIAL

## 2025-05-08 VITALS
HEIGHT: 64 IN | DIASTOLIC BLOOD PRESSURE: 80 MMHG | SYSTOLIC BLOOD PRESSURE: 118 MMHG | BODY MASS INDEX: 31.58 KG/M2 | WEIGHT: 184.97 LBS

## 2025-05-08 DIAGNOSIS — S68.119D FINGERTIP AMPUTATION, SUBSEQUENT ENCOUNTER: Primary | ICD-10-CM

## 2025-05-08 NOTE — PROGRESS NOTES
[facility]     Office Visit       Date: 05/08/2025   Patient Name: Nelli Benites  MRN: [id number]  YOB: 1980    Referring Physician: No ref. provider found     Chief Complaint:   Chief Complaint   Patient presents with    Right Hand - Pain     Small finger DOI: 5/5/25     History of Present Illness:   Nelli Benites is a 44 y.o. female right-hand-dominant presented clinic as a new patient with complaints of right small finger crush injury.  She reports that on 5/5/2025 she sustained a crushing injury to her right small finger.  There is initial pain swelling and bleeding that she cleansed and then covered.  She states that the next morning she unwrapped the covering and noticed that the distal aspect of her fingertip was gone.  She presented to the emergency department.  She reports no pain at rest and 2/10 pain when touched.  She has been performing daily dressing changes and AlumaFoam finger splinting.  She has been on Keflex.  No other complaints or concerns.    Subjective   Review of Systems:   Review of Systems   Constitutional:  Negative for chills, fever, unexpected weight gain and unexpected weight loss.   HENT:  Negative for congestion, postnasal drip and rhinorrhea.    Eyes:  Negative for blurred vision.   Respiratory:  Negative for shortness of breath.    Cardiovascular:  Negative for leg swelling.   Gastrointestinal:  Negative for abdominal pain, nausea and vomiting.   Genitourinary:  Negative for difficulty urinating.   Musculoskeletal:  Positive for arthralgias. Negative for gait problem, joint swelling and myalgias.   Skin:  Negative for skin lesions and wound.   Neurological:  Negative for dizziness, weakness, light-headedness and numbness.   Hematological:  Does not bruise/bleed easily.   Psychiatric/Behavioral:  Negative for depressed mood.       Pertinent review of systems per HPI    I  "reviewed the patient's chief complaint, history of present illness, review of systems, past medical history, surgical history, family history, social history, medications and allergy list in the EMR on 05/08/2025 and agree with the findings above.    Objective    Vital Signs:   Vitals:    05/08/25 1316   BP: 118/80   Weight: 83.9 kg (184 lb 15.5 oz)   Height: 162.6 cm (64.02\")     General: No acute distress. Alert and oriented.   Cardiovascular: Palpable radial pulse.   Respiratory: Breathing is nonlabored.   Ortho Exam:  Examination of the right small finger demonstrates a partial distal fingertip amputation.  The nail plate has been avulsed.  There is no evidence of exposed bone during exam, however this is readily palpable within the dorsal distal aspect of the wound.  Healthy surrounding granulation tissue.  No erythema or warmth.  No evidence of infection.  Sensations grossly intact light touch along the radial and ulnar borders of the digit.  Warm and well-perfused distally.    Imaging / Studies:    Imaging Results (Last 24 Hours)       ** No results found for the last 24 hours. **        Right hand xrays obtained on 5/6/2025 were personally reviewed and interpreted by myself.  These demonstrate a right small finger distal phalanx tuft avulsion with soft tissue injury.     Assessment / Plan    Assessment/Plan:   Nelli Benites is a 44 y.o. female with a right small finger partial amputation through the distal finger tuft, DOI 5/6/2025.    I discussed with the patient their clinical and radiographic findings demonstrate a small finger partial distal fingertip amputation.  We had a lengthy discussion regarding the pathophysiology of their diagnosis.  Although the patient's x-rays do look concerning for exposed bone, on examination today I am unable to identify any exposed bone readily within the wound.  The distal fingertip amputation site looks healthy and well-appearing.  I discussed with the patient our " options moving forward.  We could consider continued dry daily dressing changes with bacitracin and Coban to allow for granulation tissue and secondary healing.  There is literature to support that even in the setting of exposed bone, but this can be a good option both functionally and cosmetically for adult patients.  Additionally, we also discussed operative treatment in the form of small finger revision amputation which would require shortening of the distal phalanx bone and likely a VY advancement flap.  Expressed understanding of all of her options and she was agreeable to conservative treatment including daily dressing changes bacitracin and Coban and a custom thermoplastic splint.  We will see how she does with these measures.  She understands that she still may require a revision amputation in the future.  She will continue her antibiotics.  I will see her back in 1 week for reevaluation.  They were agreeable with the plan.  All questions and concerns were addressed.      ICD-10-CM ICD-9-CM   1. Fingertip amputation, subsequent encounter  S68.119D V54.89     886.0     Follow Up:   Return in about 1 week (around 5/15/2025) for Follow Up.      Marycarmen Vásquez MD  [facility]opedic & Hand Surgeon

## 2025-05-09 ENCOUNTER — TREATMENT (OUTPATIENT)
Dept: PHYSICAL THERAPY | Facility: CLINIC | Age: 45
End: 2025-05-09
Payer: COMMERCIAL

## 2025-05-09 DIAGNOSIS — S69.91XA FINGER INJURY, RIGHT, INITIAL ENCOUNTER: Primary | ICD-10-CM

## 2025-05-09 DIAGNOSIS — S68.119A FINGERTIP AMPUTATION, INITIAL ENCOUNTER: ICD-10-CM

## 2025-05-09 NOTE — PROGRESS NOTES
Nelli Benites 1980   Diagnosis/ Surgery: right small finger distal tip amputation               Date Of Injury: 5/5/25    Date Of Surgery:NA    Hand Dominance: right   History of Present Condition: sustained crush injury to right small finger from a machine.   Medical/Vocational History/ Medications: no significant medial history.     Pain: minimal     Edema: minimal   Sensibility: WNL   Wound Status:open wound, healthy tissue with minimal bleeding. Is doing dry dressing.   ROM/ Strength: NT     Splinting:  Patient was measure and fit with a custom fabricated cap splint.    Patient was instructed in wearing schedule, precautions and care of the splint during this visit.   Patient was instructed in proper donning/doffing of splint.   Assessment:  Patient was fitted and appropriate splint was fabricated this date.  Patient reported that splint was comfortable and had no complications with the fit of the splint.  Patient was instructed and patient verbalized understanding of precautions, wear and care of the splint.   Patient demonstrated independent donning/doffing of splint during treatment today.  Goals:  Patient was fitted properly with appropriate splint for diagnosis  Patient was educated on precautions, wear schedule and care of splint  Patient demonstrated independence with donning/doffing of the splint.  Splint was provided to Protect Healing Structures, Restrict Mobility, Improve joint alignment.  Plan:  No additional treatment is required for this patient at this time. The patient is therefore discharged from therapy.  Patient advised to contact therapist with any additional questions or concerns regarding the fit and function of the splint.  Patient will be seen for splint issues as needed   Wear Instructions: Off for hygiene, do not soak finger. Dress in gauze as instruction beneath splint.           PT SIGNATURE: Lavonne Kemp, LASHON   DATE TREATMENT INITIATED: 5/9/2025    Initial  Certification  Certification Period: 8/7/2025  I certify that the therapy services are furnished while this patient is under my care.  The services outlined above are required by this patient, and will be reviewed every 90 days.     PHYSICIAN: Marycarmen Vásquez MD      DATE:     Please sign and return via fax to [phone].. Thank you, [facility].

## 2025-05-15 ENCOUNTER — OFFICE VISIT (OUTPATIENT)
Dept: ORTHOPEDIC SURGERY | Facility: CLINIC | Age: 45
End: 2025-05-15
Payer: COMMERCIAL

## 2025-05-15 ENCOUNTER — OFFICE VISIT (OUTPATIENT)
Dept: FAMILY MEDICINE CLINIC | Facility: CLINIC | Age: 45
End: 2025-05-15
Payer: COMMERCIAL

## 2025-05-15 VITALS
DIASTOLIC BLOOD PRESSURE: 70 MMHG | WEIGHT: 184.97 LBS | BODY MASS INDEX: 31.58 KG/M2 | HEIGHT: 64 IN | SYSTOLIC BLOOD PRESSURE: 124 MMHG

## 2025-05-15 VITALS
HEIGHT: 64 IN | BODY MASS INDEX: 38.96 KG/M2 | WEIGHT: 228.2 LBS | OXYGEN SATURATION: 97 % | SYSTOLIC BLOOD PRESSURE: 118 MMHG | TEMPERATURE: 98.6 F | HEART RATE: 100 BPM | DIASTOLIC BLOOD PRESSURE: 84 MMHG

## 2025-05-15 DIAGNOSIS — F41.1 GAD (GENERALIZED ANXIETY DISORDER): ICD-10-CM

## 2025-05-15 DIAGNOSIS — S68.119D FINGERTIP AMPUTATION, SUBSEQUENT ENCOUNTER: Primary | ICD-10-CM

## 2025-05-15 RX ORDER — ESCITALOPRAM OXALATE 10 MG/1
10 TABLET ORAL DAILY
Qty: 90 TABLET | Refills: 3 | Status: SHIPPED | OUTPATIENT
Start: 2025-05-15 | End: 2026-05-15

## 2025-05-15 NOTE — PROGRESS NOTES
"                                                                 [facility]     Office Visit       Date: 05/15/2025   Patient Name: Nelli Benites  MRN: [id number]  YOB: 1980    Referring Physician: No ref. provider found     Chief Complaint:   Chief Complaint   Patient presents with    Follow-up     1 week recheck- Fingertip amputation, DOI: 5/5/25     History of Present Illness:   Nelli Benites is a 44 y.o. female hand dominant presented to clinic for follow-up of right small finger partial amputation through the distal finger pulp, DOI 5/6/2025.  Reports doing well since her last clinic visit.  She has been performing daily dressing changes with nonadherent dressings, bacitracin, gauze.  She has been compliant with splint wear.  She reports with sensitivity to the fingertip.  Otherwise no other complaints.    Subjective   Review of Systems:   Review of Systems   Pertinent review of systems per HPI    I reviewed the patient's chief complaint, history of present illness, review of systems, past medical history, surgical history, family history, social history, medications and allergy list in the EMR on 05/15/2025 and agree with the findings above.    Objective    Vital Signs:   Vitals:    05/15/25 1330   BP: 124/70   Weight: 83.9 kg (184 lb 15.5 oz)   Height: 162.6 cm (64.02\")     General: No acute distress. Alert and oriented.   Cardiovascular: Palpable radial pulse.   Respiratory: Breathing is nonlabored.   Ortho Exam:  Examination of the right small finger demonstrates a partial distal fingertip amputation.  No exposed bone.  Healing granulation tissue noted at the fingertip edge.  Slight hypersensitivity noted at the junction between the granulation tissue and skin.  She is able to actively flex and extend the DIP and PIP joints.  2 and half centimeters tip to palm.  Sensations gross intact light touch at the radial and ulnar borders.  Warm well-perfused " distally.    Imaging / Studies:    Imaging Results (Last 24 Hours)       ** No results found for the last 24 hours. **          Assessment / Plan    Assessment/Plan:   Nelli Benites is a 44 y.o. female with right small finger partial amputation through the distal finger pulp, DOI 5/6/2025.     Patient is done well since her injury.  Will continue daily dressing changes with gauze, bacitracin, Coban.  She will continue splinting.  I encouraged her to come out of the splint and allow the digit to be exposed to air when in a clean and safe environment.  I encouraged PIP and DIP motion.  I will see her back in 1 week for reevaluation.  All question concerns were addressed.  She is agreeable.      ICD-10-CM ICD-9-CM   1. Fingertip amputation, subsequent encounter  S68.119D V54.89     886.0     Follow Up:   Return in about 1 week (around 5/22/2025) for Follow Up.      Marycarmen Vásquez MD  [facility] Orthopedic & Hand Surgeon

## 2025-05-15 NOTE — ASSESSMENT & PLAN NOTE
Continue following with ortho.  Will reassuring today discussed indications to call us including worsening erythema

## 2025-05-15 NOTE — ASSESSMENT & PLAN NOTE
Continue Lexapro, we discussed potential hyper plan if she is doing well.  She called in for 5 mg dose for tapering

## 2025-05-15 NOTE — PROGRESS NOTES
"  Established Patient Office Visit        Subjective      Chief Complaint:  ER follow up (Per patient she went to the ER at [facility] in [place] due to lacerating her pinky finger on right hand. )      History of Present Illness: Nelli Benites is a 44 y.o. female who presents for follow up on distal right 5th digit amputation on 5/5/25. She sustained a crushing injury and went to the ER where they cleaned it and started her on Keflex which she completed after 7 days. She states the pain is a 3/10. She is moving her 5th finger freely without pain. She is not having any fever or chills. She saw ortho earlier today and was advised to continue moving, let it air out more, and overall was happy with her progress.     XR Hand 3+ View Right  Result Date: 5/6/2025  Impression: Partial rotation of the distal aspect distal phalanx fifth digit. Electronically Signed: Tonia Hills MD  5/6/2025 4:27 PM EDT  Workstation ID: [id number]      Patient Active Problem List   Diagnosis    Class 2 obesity due to excess calories without serious comorbidity with body mass index (BMI) of 37.0 to 37.9 in adult    Vertigo    Fingertip amputation, subsequent encounter    ABEBE (generalized anxiety disorder)         Current Outpatient Medications:     escitalopram (LEXAPRO) 10 MG tablet, Take 1 tablet by mouth Daily., Disp: 90 tablet, Rfl: 3    fluticasone (FLONASE) 50 MCG/ACT nasal spray, Administer 1 spray into the nostril(s) as directed by provider Daily., Disp: , Rfl:     multivitamin with minerals tablet tablet, Take 1 tablet by mouth Daily., Disp: , Rfl:     ondansetron ODT (ZOFRAN-ODT) 4 MG disintegrating tablet, , Disp: , Rfl:     Scopolamine 1 MG/3DAYS patch, Place 1 patch on the skin as directed by provider Every 72 (Seventy-Two) Hours., Disp: 10 each, Rfl: 2       Objective     Physical Exam:   Vital Signs:   /84   Pulse 100   Temp 98.6 °F (37 °C) (Infrared)   Ht 162.6 cm (64.02\")   Wt 104 kg (228 lb 3.2 oz)   LMP " 05/06/2025   SpO2 97%   BMI 39.15 kg/m²      Physical Exam  Constitutional:       General: She is not in acute distress.     Appearance: She is not ill-appearing.     R. 5th distal digit with granulation tissue. No erythema or signs of infection.         Assessment / Plan      Assessment/Plan:   Diagnoses and all orders for this visit:    1. Fingertip amputation, subsequent encounter (Primary)  Assessment & Plan:  Continue following with ortho.  Will reassuring today discussed indications to call us including worsening erythema      2. ABEBE (generalized anxiety disorder)  Assessment & Plan:  Continue Lexapro, we discussed potential hyper plan if she is doing well.  She called in for 5 mg dose for tapering    Orders:  -     escitalopram (LEXAPRO) 10 MG tablet; Take 1 tablet by mouth Daily.  Dispense: 90 tablet; Refill: 3         I have independently evaluated and examined the patient and agree with the above medical student/nurse practitioner student/physician assistant student note above which has been edited by me.    Follow Up:   Return if symptoms worsen or fail to improve.    MDM:     Jin Del Rosario MD  Family Medicine - [facility]

## 2025-05-22 ENCOUNTER — OFFICE VISIT (OUTPATIENT)
Dept: ORTHOPEDIC SURGERY | Facility: CLINIC | Age: 45
End: 2025-05-22
Payer: COMMERCIAL

## 2025-05-22 DIAGNOSIS — S68.119D FINGERTIP AMPUTATION, SUBSEQUENT ENCOUNTER: Primary | ICD-10-CM

## 2025-05-22 NOTE — PROGRESS NOTES
[facility]     Office Visit       Date: 05/22/2025   Patient Name: Nelli Benites  MRN: [id number]  YOB: 1980    Referring Physician: No ref. provider found     Chief Complaint:   Chief Complaint   Patient presents with    Follow-up     1 week follow up; Fingertip amputation, DOI: 5/5/25     History of Present Illness:   Nelli Benites is a 44 y.o. female right-hand-dominant presented to clinic for follow-up of right small finger partial amputation through the distal finger pulp, DOI 5/6/2025.  Reports doing well since her last clinic visit.  She has been performing daily dressing changes and feels overall the digit is looking better, however feels more sensitive at the wound.  There is been no drainage.  No other complaints or concerns.    Subjective   Review of Systems:   Review of Systems   Constitutional: Negative.    HENT: Negative.     Eyes: Negative.    Respiratory: Negative.     Cardiovascular: Negative.    Gastrointestinal: Negative.    Endocrine: Negative.    Genitourinary: Negative.    Musculoskeletal:  Positive for arthralgias.   Skin: Negative.    Allergic/Immunologic: Negative.    Neurological: Negative.    Hematological: Negative.    Psychiatric/Behavioral: Negative.        Pertinent review of systems per HPI    I reviewed the patient's chief complaint, history of present illness, review of systems, past medical history, surgical history, family history, social history, medications and allergy list in the EMR on 05/22/2025 and agree with the findings above.    Objective    Vital Signs: There were no vitals filed for this visit.  General: No acute distress. Alert and oriented.   Cardiovascular: Palpable radial pulse.   Respiratory: Breathing is nonlabored.   Ortho Exam:  Examination of the right small finger demonstrates a distal fingertip partial amputation with healing granulation tissue.  This  appears healthy without significant erythema warmth or drainage.  There is sensitivity noted to the wound edges.  She is able to make a composite fist with the digit.  Sensation is gross intact light touch throughout the radial and ulnar borders of the digit.  Warm and well-perfused distally.    Imaging / Studies:    Imaging Results (Last 24 Hours)       ** No results found for the last 24 hours. **          Assessment / Plan    Assessment/Plan:   Nelli Benites is a 44 y.o. female with right small finger partial amputation through the distal finger pulp, DOI 5/6/2025.      Has done well since her injury.  Will continue with daily dressing changes including bacitracin gauze and Coban.  I encouraged the digit to be exposed to air when in a clean and safe environment.  Also encouraged continued PIP range of motion stiffness.  I will see her back in 2 weeks for wound check.  All question concerns were addressed.  She is agreeable.      ICD-10-CM ICD-9-CM   1. Fingertip amputation, subsequent encounter  S68.119D V54.89     886.0     Follow Up:   Return in about 2 weeks (around 6/5/2025) for Follow Up.      Marycarmen Vásquez MD  [facility]

## 2025-06-05 ENCOUNTER — OFFICE VISIT (OUTPATIENT)
Dept: ORTHOPEDIC SURGERY | Facility: CLINIC | Age: 45
End: 2025-06-05
Payer: COMMERCIAL

## 2025-06-05 VITALS
HEIGHT: 64 IN | DIASTOLIC BLOOD PRESSURE: 72 MMHG | SYSTOLIC BLOOD PRESSURE: 118 MMHG | BODY MASS INDEX: 38.93 KG/M2 | WEIGHT: 228 LBS

## 2025-06-05 DIAGNOSIS — S68.119D FINGERTIP AMPUTATION, SUBSEQUENT ENCOUNTER: Primary | ICD-10-CM

## 2025-06-05 NOTE — PROGRESS NOTES
"                                                                 [facility]     Office Visit       Date: 06/05/2025   Patient Name: Nelli Benites  MRN: [id number]  YOB: 1980    Referring Physician: No ref. provider found     Chief Complaint:   Chief Complaint   Patient presents with    Follow-up     2 week follow up -- Fingertip amputation, DOI: 5/5/25     History of Present Illness:   Nelli Benites is a 44 y.o. female right-hand-dominant presented to clinic for follow-up of right small finger partial amputation through the distal finger pulp, DOI 5/6/2025.  Reports doing well since her last clinic visit.  She has had no wound complications.  She continues to perform daily dressing changes with bacitracin and Coban.  No other complaints.    Subjective   Review of Systems:   Review of Systems   Pertinent review of systems per HPI    I reviewed the patient's chief complaint, history of present illness, review of systems, past medical history, surgical history, family history, social history, medications and allergy list in the EMR on 06/05/2025 and agree with the findings above.    Objective    Vital Signs:   Vitals:    06/05/25 0818   BP: 118/72   Weight: 103 kg (228 lb)   Height: 162.6 cm (64\")     General: No acute distress. Alert and oriented.   Cardiovascular: Palpable radial pulse.   Respiratory: Breathing is nonlabored.   Ortho Exam:  Examination of the right small finger demonstrates healing granulation tissue noted at the distal fingertip.  There is evidence of new proximal nail growth.  No drainage and very minimal erythema.  She will make full composite fist.  She is minimally tender to distal fingertip with slight hypersensitivity noted at the very distal aspect.  Warm and well-perfused distally.    Imaging / Studies:    Imaging Results (Last 24 Hours)       ** No results found for the last 24 hours. **          Assessment / Plan    Assessment/Plan:   Nelli" Norma Benites is a 44 y.o. female with right small finger partial amputation through the distal finger pulp, DOI 5/6/2025.      Patient has done well since her injury and her wound appears to be granulating in nicely.  She will continue with dressing changes and may discontinue's of the bacitracin as the granulation tissue completely heals.  I encouraged digit range of motion.  She may discontinue use of her splint.  I will see her back in 3 weeks for evaluation of wound healing.  All questions and concerns were addressed.  She is agreeable.      ICD-10-CM ICD-9-CM   1. Fingertip amputation, subsequent encounter  S68.119D V54.89     886.0     Follow Up:   Return in about 3 weeks (around 6/26/2025) for Follow Up.      Marycarmen Vásquez MD  [facility] [place] [facility]

## 2025-06-26 ENCOUNTER — OFFICE VISIT (OUTPATIENT)
Dept: ORTHOPEDIC SURGERY | Facility: CLINIC | Age: 45
End: 2025-06-26
Payer: COMMERCIAL

## 2025-06-26 VITALS
DIASTOLIC BLOOD PRESSURE: 84 MMHG | SYSTOLIC BLOOD PRESSURE: 118 MMHG | BODY MASS INDEX: 38.93 KG/M2 | WEIGHT: 228 LBS | HEIGHT: 64 IN

## 2025-06-26 DIAGNOSIS — S68.119D FINGERTIP AMPUTATION, SUBSEQUENT ENCOUNTER: Primary | ICD-10-CM

## 2025-06-26 NOTE — PROGRESS NOTES
"                                                                 [facility]     Office Visit       Date: 06/26/2025   Patient Name: Nelli Benites  MRN: [id number]  YOB: 1980    Referring Physician: No ref. provider found     Chief Complaint:   Chief Complaint   Patient presents with    Follow-up     3 Week Follow Up - Right Small Fingertip amputation,     History of Present Illness:   Nelli Benites is a 44 y.o. female  right-hand-dominant presented to clinic for follow-up of right small finger partial amputation through the distal finger pulp, DOI 5/6/2025.  Reports doing well since her last clinic visit.  She reports that her sensitivity is improving with time she has only mild discomfort when the very distal fingertip is bumped or hit.  No other complaints or concerns.    Subjective   Review of Systems:   Review of Systems   Pertinent review of systems per HPI    I reviewed the patient's chief complaint, history of present illness, review of systems, past medical history, surgical history, family history, social history, medications and allergy list in the EMR on 06/26/2025 and agree with the findings above.    Objective    Vital Signs:   Vitals:    06/26/25 1308   BP: 118/84   BP Location: Left arm   Patient Position: Sitting   Weight: 103 kg (228 lb)   Height: 162.6 cm (64.02\")     General: No acute distress. Alert and oriented.   Cardiovascular: Palpable radial pulse.   Respiratory: Breathing is nonlabored.   Ortho Exam:  Examination of the right small finger demonstrates a healed partial amputation to the distal fingertip.  There are no open areas of healing.  There is improved sensitivity to the distal fingertip.  Evidence of proximal nail growth without hook deformity.  She is able to make a full composite fist.  Sensations gross intact light touch along the radial and ulnar borders of the digit.  Warm and well-perfused distally.    Imaging / Studies:    Imaging " Results (Last 24 Hours)       ** No results found for the last 24 hours. **          Assessment / Plan    Assessment/Plan:   Nelli Benites is a 44 y.o. female  with right small finger partial amputation through the distal finger pulp, DOI 5/6/2025.     Patient is an extremely well after partial amputation to the small finger.  She has had a complete wound healing.  There is still some residual sensitivity noted to the distal fingertip pulp, however this is improving with time.  She may continue her activity as she tolerates.  No dressings are required.  She has no restrictions.  I will see her back for final follow-up in 6 weeks.  All questions and concerns were addressed.  She is agreeable.      ICD-10-CM ICD-9-CM   1. Fingertip amputation, subsequent encounter  S68.119D V54.89     886.0     Follow Up:   Return in about 6 weeks (around 8/7/2025) for Follow Up.      Marycarmen Vásquez MD  [facility] [place] [facility]

## 2025-08-14 ENCOUNTER — OFFICE VISIT (OUTPATIENT)
Dept: ORTHOPEDIC SURGERY | Facility: CLINIC | Age: 45
End: 2025-08-14
Payer: COMMERCIAL

## 2025-08-14 VITALS
SYSTOLIC BLOOD PRESSURE: 118 MMHG | WEIGHT: 235.4 LBS | DIASTOLIC BLOOD PRESSURE: 84 MMHG | BODY MASS INDEX: 40.19 KG/M2 | HEIGHT: 64 IN

## 2025-08-14 DIAGNOSIS — S68.119D FINGERTIP AMPUTATION, SUBSEQUENT ENCOUNTER: Primary | ICD-10-CM
